# Patient Record
Sex: FEMALE | Race: WHITE | NOT HISPANIC OR LATINO
[De-identification: names, ages, dates, MRNs, and addresses within clinical notes are randomized per-mention and may not be internally consistent; named-entity substitution may affect disease eponyms.]

---

## 2018-04-26 ENCOUNTER — RESULT REVIEW (OUTPATIENT)
Age: 38
End: 2018-04-26

## 2019-06-26 ENCOUNTER — RECORD ABSTRACTING (OUTPATIENT)
Age: 39
End: 2019-06-26

## 2019-06-26 DIAGNOSIS — Z80.49 FAMILY HISTORY OF MALIGNANT NEOPLASM OF OTHER GENITAL ORGANS: ICD-10-CM

## 2019-06-26 DIAGNOSIS — N96 RECURRENT PREGNANCY LOSS: ICD-10-CM

## 2019-06-26 DIAGNOSIS — Z82.49 FAMILY HISTORY OF ISCHEMIC HEART DISEASE AND OTHER DISEASES OF THE CIRCULATORY SYSTEM: ICD-10-CM

## 2019-06-26 DIAGNOSIS — Z86.19 PERSONAL HISTORY OF OTHER INFECTIOUS AND PARASITIC DISEASES: ICD-10-CM

## 2019-06-26 DIAGNOSIS — Z80.1 FAMILY HISTORY OF MALIGNANT NEOPLASM OF TRACHEA, BRONCHUS AND LUNG: ICD-10-CM

## 2019-06-26 DIAGNOSIS — E55.9 VITAMIN D DEFICIENCY, UNSPECIFIED: ICD-10-CM

## 2019-06-26 LAB — CYTOLOGY CVX/VAG DOC THIN PREP: NORMAL

## 2019-06-26 RX ORDER — UBIDECARENONE/VIT E ACET 100MG-5
CAPSULE ORAL
Refills: 0 | Status: ACTIVE | COMMUNITY

## 2019-06-27 ENCOUNTER — APPOINTMENT (OUTPATIENT)
Dept: OBGYN | Facility: CLINIC | Age: 39
End: 2019-06-27
Payer: COMMERCIAL

## 2019-06-27 VITALS
DIASTOLIC BLOOD PRESSURE: 60 MMHG | BODY MASS INDEX: 25.49 KG/M2 | HEIGHT: 61 IN | WEIGHT: 135 LBS | SYSTOLIC BLOOD PRESSURE: 98 MMHG

## 2019-06-27 DIAGNOSIS — Z00.00 ENCOUNTER FOR GENERAL ADULT MEDICAL EXAMINATION W/OUT ABNORMAL FINDINGS: ICD-10-CM

## 2019-06-27 PROCEDURE — 81025 URINE PREGNANCY TEST: CPT

## 2019-06-27 PROCEDURE — 81003 URINALYSIS AUTO W/O SCOPE: CPT | Mod: QW

## 2019-06-27 PROCEDURE — 99395 PREV VISIT EST AGE 18-39: CPT

## 2019-06-29 LAB — HPV HIGH+LOW RISK DNA PNL CVX: NOT DETECTED

## 2019-07-01 ENCOUNTER — RESULT CHARGE (OUTPATIENT)
Age: 39
End: 2019-07-01

## 2019-07-01 NOTE — PHYSICAL EXAM
[Awake] : awake [Alert] : alert [Soft] : soft [Oriented x3] : oriented to person, place, and time [Labia Majora] : labia major [Labia Minora] : labia minora [Normal] : clitoris [No Bleeding] : there was no active vaginal bleeding [Uterine Adnexae] : were not tender and not enlarged [Acute Distress] : no acute distress [Mass] : no breast mass [Nipple Discharge] : no nipple discharge [Axillary LAD] : no axillary lymphadenopathy [Tender] : non tender [FreeTextEntry6] : normal

## 2019-07-03 LAB
BILIRUB UR QL STRIP: NEGATIVE
CLARITY UR: CLEAR
COLLECTION METHOD: NORMAL
CYTOLOGY CVX/VAG DOC THIN PREP: NORMAL
GLUCOSE UR-MCNC: NEGATIVE
HCG UR QL: 0.2 EU/DL
HCG UR QL: NEGATIVE
HGB UR QL STRIP.AUTO: NEGATIVE
KETONES UR-MCNC: NEGATIVE
LEUKOCYTE ESTERASE UR QL STRIP: NEGATIVE
NITRITE UR QL STRIP: NEGATIVE
PH UR STRIP: 7
PROT UR STRIP-MCNC: NEGATIVE
QUALITY CONTROL: YES
SP GR UR STRIP: 1.01

## 2019-07-13 ENCOUNTER — TRANSCRIPTION ENCOUNTER (OUTPATIENT)
Age: 39
End: 2019-07-13

## 2019-07-18 ENCOUNTER — TRANSCRIPTION ENCOUNTER (OUTPATIENT)
Age: 39
End: 2019-07-18

## 2019-07-24 ENCOUNTER — TRANSCRIPTION ENCOUNTER (OUTPATIENT)
Age: 39
End: 2019-07-24

## 2020-01-21 ENCOUNTER — APPOINTMENT (OUTPATIENT)
Dept: OBGYN | Facility: CLINIC | Age: 40
End: 2020-01-21
Payer: COMMERCIAL

## 2020-01-21 VITALS
SYSTOLIC BLOOD PRESSURE: 130 MMHG | HEIGHT: 61 IN | WEIGHT: 142 LBS | BODY MASS INDEX: 26.81 KG/M2 | DIASTOLIC BLOOD PRESSURE: 82 MMHG

## 2020-01-21 DIAGNOSIS — Z78.9 OTHER SPECIFIED HEALTH STATUS: ICD-10-CM

## 2020-01-21 PROCEDURE — 56501 DESTROY VULVA LESIONS SIM: CPT

## 2020-01-21 PROCEDURE — 99213 OFFICE O/P EST LOW 20 MIN: CPT | Mod: 25

## 2020-01-22 NOTE — COUNSELING
[Preconception Care] : preconception care [Menstrual Calendar] : menstrual calendar [Fertility Options] : fertility options

## 2020-01-22 NOTE — PHYSICAL EXAM
[C.Acuminatum] : condyloma acuminatum [Labia Majora] : labia major [Labia Minora] : labia minora [Normal] : clitoris [de-identified] : 3 small condyloma noted one on left inner, one lower left vulva, one on inner right vulva  - tx TCA

## 2020-01-22 NOTE — HISTORY OF PRESENT ILLNESS
[Reproductive Age] : is of reproductive age [Last Pap ___] : Last cervical pap smear was [unfilled] [Menarche Age: ____] : age at menarche was [unfilled] [Pregnancy History] : pregnancy history: [Total Preg ___] : [unfilled] [Living ___] : [unfilled] [AB Spont ___] : miscarriages: [unfilled] [Definite:  ___ (Date)] : the last menstrual period was [unfilled] [Sexually Active] : is sexually active [Monogamous] : is monogamous [Male ___] : [unfilled] male [No] : no [Menstrual Problems] : reports normal menses [Contraception] : does not use contraception

## 2020-01-22 NOTE — DISCUSSION/SUMMARY
[FreeTextEntry1] : Pt aware condyloma should resolve in 2 weeks.  Ovulation discussed at length.  Pt will initiate coitus, every other day, starting day 10 of her cycle.  Pt considering having her  see urologist for  evaluation.  All the pt's questions and concerns were addressed.

## 2020-02-25 ENCOUNTER — APPOINTMENT (OUTPATIENT)
Dept: OBGYN | Facility: CLINIC | Age: 40
End: 2020-02-25
Payer: COMMERCIAL

## 2020-02-25 VITALS
BODY MASS INDEX: 27.38 KG/M2 | DIASTOLIC BLOOD PRESSURE: 64 MMHG | SYSTOLIC BLOOD PRESSURE: 112 MMHG | HEIGHT: 61 IN | WEIGHT: 145 LBS

## 2020-02-25 LAB
HCG UR QL: POSITIVE
QUALITY CONTROL: YES

## 2020-02-25 PROCEDURE — 99213 OFFICE O/P EST LOW 20 MIN: CPT

## 2020-02-25 PROCEDURE — 81025 URINE PREGNANCY TEST: CPT

## 2020-02-25 NOTE — HISTORY OF PRESENT ILLNESS
[1 Year Ago] : 1 year ago [Irregular Menses] : irregular menses [___ Month(s) Ago] : [unfilled] month(s) ago [Good] : being in good health [Healthy Diet] : a healthy diet [Regular Exercise] : regular exercise [Last Pap ___] : Last cervical pap smear was [unfilled] [Reproductive Age] : is of reproductive age [Pregnancy History] : denies prior pregnancies [Definite:  ___ (Date)] : the last menstrual period was [unfilled] [Menarche Age: ____] : age at menarche was [unfilled] [Sexually Active] : is sexually active [Monogamous] : is monogamous [Contraception] : does not use contraception [Male ___] : [unfilled] male

## 2020-02-25 NOTE — DISCUSSION/SUMMARY
[FreeTextEntry1] : PT to RTO 1stOB/sono.  Call parameters reviewed.  All the pt's and 's questions and concerns were addressed.

## 2020-02-25 NOTE — HISTORY OF PRESENT ILLNESS
[1 Year Ago] : 1 year ago [Irregular Menses] : irregular menses [___ Month(s) Ago] : [unfilled] month(s) ago [Good] : being in good health [Healthy Diet] : a healthy diet [Regular Exercise] : regular exercise [Last Pap ___] : Last cervical pap smear was [unfilled] [Reproductive Age] : is of reproductive age [Pregnancy History] : denies prior pregnancies [Definite:  ___ (Date)] : the last menstrual period was [unfilled] [Menarche Age: ____] : age at menarche was [unfilled] [Sexually Active] : is sexually active [Contraception] : does not use contraception [Monogamous] : is monogamous [Male ___] : [unfilled] male

## 2020-03-16 ENCOUNTER — NON-APPOINTMENT (OUTPATIENT)
Age: 40
End: 2020-03-16

## 2020-03-16 ENCOUNTER — APPOINTMENT (OUTPATIENT)
Dept: OBGYN | Facility: CLINIC | Age: 40
End: 2020-03-16
Payer: COMMERCIAL

## 2020-03-16 ENCOUNTER — ASOB RESULT (OUTPATIENT)
Age: 40
End: 2020-03-16

## 2020-03-16 VITALS
BODY MASS INDEX: 27.68 KG/M2 | DIASTOLIC BLOOD PRESSURE: 76 MMHG | SYSTOLIC BLOOD PRESSURE: 116 MMHG | WEIGHT: 141 LBS | HEIGHT: 60 IN

## 2020-03-16 LAB
BILIRUB UR QL STRIP: NORMAL
COLLECTION METHOD: NORMAL
GLUCOSE UR-MCNC: NORMAL
HCG UR QL: 0.2 EU/DL
HGB UR QL STRIP.AUTO: NORMAL
KETONES UR-MCNC: NORMAL
LEUKOCYTE ESTERASE UR QL STRIP: NORMAL
NITRITE UR QL STRIP: NORMAL
PH UR STRIP: 7
PROT UR STRIP-MCNC: NORMAL
SP GR UR STRIP: 1.01

## 2020-03-16 PROCEDURE — 0500F INITIAL PRENATAL CARE VISIT: CPT

## 2020-03-16 PROCEDURE — 76817 TRANSVAGINAL US OBSTETRIC: CPT

## 2020-03-16 PROCEDURE — 36415 COLL VENOUS BLD VENIPUNCTURE: CPT

## 2020-03-16 PROCEDURE — 99214 OFFICE O/P EST MOD 30 MIN: CPT | Mod: 25

## 2020-03-17 LAB
ABO + RH PNL BLD: NORMAL
BASOPHILS # BLD AUTO: 0.1 K/UL
BASOPHILS NFR BLD AUTO: 0.9 %
BLD GP AB SCN SERPL QL: NORMAL
C TRACH RRNA SPEC QL NAA+PROBE: NOT DETECTED
CMV IGG SERPL QL: <0.2 U/ML
CMV IGG SERPL-IMP: NEGATIVE
CMV IGM SERPL QL: <8 AU/ML
CMV IGM SERPL QL: NEGATIVE
EOSINOPHIL # BLD AUTO: 0.18 K/UL
EOSINOPHIL NFR BLD AUTO: 1.6 %
ESTIMATED AVERAGE GLUCOSE: 94 MG/DL
HBA1C MFR BLD HPLC: 4.9 %
HBV SURFACE AG SER QL: NONREACTIVE
HCT VFR BLD CALC: 36.6 %
HGB BLD-MCNC: 12.2 G/DL
HIV1+2 AB SPEC QL IA.RAPID: NONREACTIVE
IMM GRANULOCYTES NFR BLD AUTO: 0.5 %
LYMPHOCYTES # BLD AUTO: 2.5 K/UL
LYMPHOCYTES NFR BLD AUTO: 22.3 %
MAN DIFF?: NORMAL
MCHC RBC-ENTMCNC: 31.9 PG
MCHC RBC-ENTMCNC: 33.3 GM/DL
MCV RBC AUTO: 95.6 FL
MEV IGG FLD QL IA: 9.6 AU/ML
MEV IGG+IGM SER-IMP: NEGATIVE
MONOCYTES # BLD AUTO: 0.88 K/UL
MONOCYTES NFR BLD AUTO: 7.8 %
N GONORRHOEA RRNA SPEC QL NAA+PROBE: NOT DETECTED
NEUTROPHILS # BLD AUTO: 7.51 K/UL
NEUTROPHILS NFR BLD AUTO: 66.9 %
PLATELET # BLD AUTO: 372 K/UL
RBC # BLD: 3.83 M/UL
RBC # FLD: 12.5 %
RUBV IGG FLD-ACNC: 14.9 INDEX
RUBV IGG SER-IMP: POSITIVE
SOURCE AMPLIFICATION: NORMAL
T GONDII AB SER-IMP: NEGATIVE
T GONDII AB SER-IMP: NEGATIVE
T GONDII IGG SER QL: <3 IU/ML
T GONDII IGM SER QL: <3 AU/ML
T PALLIDUM AB SER QL IA: NEGATIVE
TSH SERPL-ACNC: 3.96 UIU/ML
WBC # FLD AUTO: 11.23 K/UL

## 2020-03-19 LAB
B19V IGG SER QL IA: 6.8 INDEX
B19V IGG+IGM SER-IMP: NORMAL
B19V IGG+IGM SER-IMP: POSITIVE
B19V IGM FLD-ACNC: 0.1
B19V IGM SER-ACNC: NEGATIVE
HGB A MFR BLD: 97.4 %
HGB A2 MFR BLD: 2.6 %
HGB FRACT BLD-IMP: NORMAL

## 2020-03-20 LAB — FMR1 GENE MUT ANL BLD/T: NORMAL

## 2020-03-23 LAB
AR GENE MUT ANL BLD/T: NEGATIVE
CFTR MUT TESTED BLD/T: NEGATIVE

## 2020-04-01 ENCOUNTER — APPOINTMENT (OUTPATIENT)
Dept: ANTEPARTUM | Facility: CLINIC | Age: 40
End: 2020-04-01

## 2020-04-03 ENCOUNTER — APPOINTMENT (OUTPATIENT)
Dept: MATERNAL FETAL MEDICINE | Facility: CLINIC | Age: 40
End: 2020-04-03
Payer: COMMERCIAL

## 2020-04-03 ENCOUNTER — ASOB RESULT (OUTPATIENT)
Age: 40
End: 2020-04-03

## 2020-04-03 PROCEDURE — 99441: CPT

## 2020-04-04 ENCOUNTER — LABORATORY RESULT (OUTPATIENT)
Age: 40
End: 2020-04-04

## 2020-04-09 ENCOUNTER — APPOINTMENT (OUTPATIENT)
Dept: OBGYN | Facility: CLINIC | Age: 40
End: 2020-04-09
Payer: COMMERCIAL

## 2020-04-09 ENCOUNTER — NON-APPOINTMENT (OUTPATIENT)
Age: 40
End: 2020-04-09

## 2020-04-09 ENCOUNTER — ASOB RESULT (OUTPATIENT)
Age: 40
End: 2020-04-09

## 2020-04-09 VITALS — DIASTOLIC BLOOD PRESSURE: 65 MMHG | WEIGHT: 139 LBS | BODY MASS INDEX: 27.15 KG/M2 | SYSTOLIC BLOOD PRESSURE: 105 MMHG

## 2020-04-09 LAB
BILIRUB UR QL STRIP: NORMAL
CLARITY UR: CLEAR
COLLECTION METHOD: NORMAL
GLUCOSE UR-MCNC: NORMAL
HCG UR QL: 0.2 EU/DL
HGB UR QL STRIP.AUTO: NORMAL
KETONES UR-MCNC: NORMAL
LEUKOCYTE ESTERASE UR QL STRIP: NORMAL
NITRITE UR QL STRIP: NORMAL
PH UR STRIP: 7
PROT UR STRIP-MCNC: NORMAL
SP GR UR STRIP: 1.01

## 2020-04-09 PROCEDURE — 76813 OB US NUCHAL MEAS 1 GEST: CPT

## 2020-04-09 PROCEDURE — 76801 OB US < 14 WKS SINGLE FETUS: CPT | Mod: 59

## 2020-04-09 PROCEDURE — 0502F SUBSEQUENT PRENATAL CARE: CPT

## 2020-04-09 PROCEDURE — 99213 OFFICE O/P EST LOW 20 MIN: CPT

## 2020-04-09 PROCEDURE — 36415 COLL VENOUS BLD VENIPUNCTURE: CPT

## 2020-04-10 VITALS — BODY MASS INDEX: 27.48 KG/M2 | WEIGHT: 140 LBS | HEIGHT: 60 IN

## 2020-04-10 PROBLEM — Z86.59 HISTORY OF ANXIETY: Status: RESOLVED | Noted: 2019-06-26 | Resolved: 2020-04-10

## 2020-04-10 PROBLEM — Z34.91 ENCOUNTER FOR SUPERVISION OF NORMAL PREGNANCY IN FIRST TRIMESTER, UNSPECIFIED GRAVIDITY: Status: RESOLVED | Noted: 2020-03-16 | Resolved: 2020-04-10

## 2020-04-10 PROBLEM — Z32.01 PREGNANCY CONFIRMED BY POSITIVE URINE TEST: Status: RESOLVED | Noted: 2020-02-25 | Resolved: 2020-04-10

## 2020-04-10 PROBLEM — Z01.419 ENCOUNTER FOR ANNUAL ROUTINE GYNECOLOGICAL EXAMINATION: Status: RESOLVED | Noted: 2019-06-27 | Resolved: 2020-04-10

## 2020-04-10 PROBLEM — Z31.69 ENCOUNTER FOR PRECONCEPTION CONSULTATION: Status: RESOLVED | Noted: 2020-01-21 | Resolved: 2020-04-10

## 2020-04-10 PROBLEM — Z87.09 HISTORY OF ASTHMA: Status: RESOLVED | Noted: 2019-06-26 | Resolved: 2020-04-10

## 2020-04-10 PROBLEM — Z87.42 HISTORY OF MENORRHAGIA: Status: RESOLVED | Noted: 2019-06-26 | Resolved: 2020-04-10

## 2020-04-10 PROBLEM — R87.610 ATYPICAL SQUAMOUS CELLS OF UNDETERMINED SIGNIFICANCE (ASCUS) ON PAPANICOLAOU SMEAR OF CERVIX: Status: RESOLVED | Noted: 2019-06-26 | Resolved: 2020-04-10

## 2020-04-10 PROBLEM — A63.0 CONDYLOMA ACUMINATUM IN FEMALE: Status: RESOLVED | Noted: 2019-06-26 | Resolved: 2020-04-10

## 2020-04-10 RX ORDER — VITAMIN C, CALCIUM, IRON, VITAMIN D3, VITAMIN E, VITAMIN B1, VITAMIN B2, VITAMIN B3, VITAMIN B6, FOLIC ACID, IODINE, ZINC, COPPER, DOCUSATE SODIUM, DOCOSAHEXAENOIC ACID (DHA) 27-1-50 MG
KIT ORAL
Refills: 0 | Status: ACTIVE | COMMUNITY

## 2020-04-14 LAB
1ST TRIMESTER DATA: NORMAL
ADDENDUM DOC: NORMAL
AFP PNL SERPL: NORMAL
AFP SERPL-ACNC: NORMAL
CLINICAL BIOCHEMIST REVIEW: NORMAL
FREE BETA HCG 1ST TRIMESTER: NORMAL
Lab: NORMAL
NASAL BONE: PRESENT
NOTES NTD: NORMAL
NT: NORMAL
PAPP-A SERPL-ACNC: NORMAL
TRISOMY 18/3: NORMAL

## 2020-04-16 ENCOUNTER — APPOINTMENT (OUTPATIENT)
Dept: ANTEPARTUM | Facility: CLINIC | Age: 40
End: 2020-04-16

## 2020-04-16 ENCOUNTER — APPOINTMENT (OUTPATIENT)
Dept: MATERNAL FETAL MEDICINE | Facility: CLINIC | Age: 40
End: 2020-04-16
Payer: SELF-PAY

## 2020-04-16 DIAGNOSIS — R87.610 ATYPICAL SQUAMOUS CELLS OF UNDETERMINED SIGNIFICANCE ON CYTOLOGIC SMEAR OF CERVIX (ASC-US): ICD-10-CM

## 2020-04-16 DIAGNOSIS — Z87.42 PERSONAL HISTORY OF OTHER DISEASES OF THE FEMALE GENITAL TRACT: ICD-10-CM

## 2020-04-16 DIAGNOSIS — Z34.91 ENCOUNTER FOR SUPERVISION OF NORMAL PREGNANCY, UNSPECIFIED, FIRST TRIMESTER: ICD-10-CM

## 2020-04-16 DIAGNOSIS — O09.522 SUPERVISION OF ELDERLY MULTIGRAVIDA, SECOND TRIMESTER: ICD-10-CM

## 2020-04-16 DIAGNOSIS — Z86.59 PERSONAL HISTORY OF OTHER MENTAL AND BEHAVIORAL DISORDERS: ICD-10-CM

## 2020-04-16 DIAGNOSIS — Z3A.13 13 WEEKS GESTATION OF PREGNANCY: ICD-10-CM

## 2020-04-16 DIAGNOSIS — A63.0 ANOGENITAL (VENEREAL) WARTS: ICD-10-CM

## 2020-04-16 DIAGNOSIS — Z87.09 PERSONAL HISTORY OF OTHER DISEASES OF THE RESPIRATORY SYSTEM: ICD-10-CM

## 2020-04-16 DIAGNOSIS — Z32.01 ENCOUNTER FOR PREGNANCY TEST, RESULT POSITIVE: ICD-10-CM

## 2020-04-16 DIAGNOSIS — Z31.69 ENCOUNTER FOR OTHER GENERAL COUNSELING AND ADVICE ON PROCREATION: ICD-10-CM

## 2020-04-16 DIAGNOSIS — Z01.419 ENCOUNTER FOR GYNECOLOGICAL EXAMINATION (GENERAL) (ROUTINE) W/OUT ABNORMAL FINDINGS: ICD-10-CM

## 2020-04-16 PROCEDURE — 99203 OFFICE O/P NEW LOW 30 MIN: CPT | Mod: 95

## 2020-04-16 NOTE — DISCUSSION/SUMMARY
[FreeTextEntry1] : America is a 39-year-old  being seen at 13 1/2 weeks estimated gestational age for advanced maternal age. Her obstetrical history is significant for one first trimester miscarriage. The patient has had genetic counseling, a report was sent under separate cover.\par \par Patient had genetic counseling performed on . First trimester genetic testing was performed and demonstrated low risk for fetal aneuploidy of greater than one in 10,000. In addition non interventional prenatal screening was also performed and showed low risk for fetal aneuploidy with gender determined to be female. Evaluation of all the patient's blood were performed in your office is mostly within normal limits. She has a slight elevation in her white blood cell count that is of questionable clinical significance. In addition she has a positive IgG antibody for parvovirus B19. All other blood tests performed are normal. All of these results were discussed with the patient and the significance of all the above was discussed. The patient has an appointment with you in 3 weeks and sequential blood work should performed. A comprehensive ultrasound at approximately 22 weeks will be scheduled. Clovid-19 was discussed with the patient and proper precautions were also discussed. The patient was advised that should she demonstrate symptoms consistent with this virus that she should stay home and assume she has the virus and should symptoms progress medical evaluation would be recommended. Routine prenatal care done is recommended. All of the above was discussed with the patient and all of her questions were answered.\par \par Her mother has hypertension. She had asthma as a child. She has no previous surgical history. She is allergic to amoxicillin and denies alcohol, tobacco or drug use.\par \par I spent a total of 30 minutes of which greater than 50% was counseling and coordinating care.\par \par Recommendations;\par \par #1. Sequential blood work between 16 and 18 weeks.\par #2. Comprehensive ultrasound at 22 weeks is recommended.\par #3. Routine prenatal care.\par #4. Followup maternal fetal medicine consultation as clinically indicated.

## 2020-05-07 ENCOUNTER — NON-APPOINTMENT (OUTPATIENT)
Age: 40
End: 2020-05-07

## 2020-05-07 ENCOUNTER — APPOINTMENT (OUTPATIENT)
Dept: OBGYN | Facility: CLINIC | Age: 40
End: 2020-05-07
Payer: COMMERCIAL

## 2020-05-07 VITALS
DIASTOLIC BLOOD PRESSURE: 72 MMHG | WEIGHT: 140 LBS | HEIGHT: 61 IN | BODY MASS INDEX: 26.43 KG/M2 | SYSTOLIC BLOOD PRESSURE: 100 MMHG

## 2020-05-07 PROCEDURE — 0502F SUBSEQUENT PRENATAL CARE: CPT

## 2020-05-07 PROCEDURE — 99213 OFFICE O/P EST LOW 20 MIN: CPT

## 2020-05-09 LAB
BILIRUB UR QL STRIP: NORMAL
CLARITY UR: CLEAR
COLLECTION METHOD: NORMAL
GLUCOSE UR-MCNC: NORMAL
HCG UR QL: 0.2 EU/DL
HGB UR QL STRIP.AUTO: NORMAL
KETONES UR-MCNC: NORMAL
LEUKOCYTE ESTERASE UR QL STRIP: NORMAL
NITRITE UR QL STRIP: NORMAL
PH UR STRIP: 7.5
PROT UR STRIP-MCNC: NORMAL
SP GR UR STRIP: 1.01

## 2020-05-11 LAB
1ST TRIMESTER DATA: NORMAL
2ND TRIMESTER DATA: NORMAL
AFP PNL SERPL: NORMAL
AFP SERPL-ACNC: NORMAL
AFP SERPL-ACNC: NORMAL
B-HCG FREE SERPL-MCNC: NORMAL
CLINICAL BIOCHEMIST REVIEW: NORMAL
FREE BETA HCG 1ST TRIMESTER: NORMAL
INHIBIN A SERPL-MCNC: NORMAL
NASAL BONE: PRESENT
NOTES NTD: NORMAL
NT: NORMAL
PAPP-A SERPL-ACNC: NORMAL
U ESTRIOL SERPL-SCNC: NORMAL

## 2020-05-18 ENCOUNTER — TRANSCRIPTION ENCOUNTER (OUTPATIENT)
Age: 40
End: 2020-05-18

## 2020-06-02 ENCOUNTER — TRANSCRIPTION ENCOUNTER (OUTPATIENT)
Age: 40
End: 2020-06-02

## 2020-06-09 ENCOUNTER — APPOINTMENT (OUTPATIENT)
Dept: OBGYN | Facility: CLINIC | Age: 40
End: 2020-06-09
Payer: COMMERCIAL

## 2020-06-09 PROCEDURE — ZZZZZ: CPT

## 2020-06-12 ENCOUNTER — ASOB RESULT (OUTPATIENT)
Age: 40
End: 2020-06-12

## 2020-06-12 ENCOUNTER — APPOINTMENT (OUTPATIENT)
Dept: ANTEPARTUM | Facility: CLINIC | Age: 40
End: 2020-06-12
Payer: COMMERCIAL

## 2020-06-12 PROCEDURE — 76811 OB US DETAILED SNGL FETUS: CPT | Mod: 59

## 2020-06-15 ENCOUNTER — APPOINTMENT (OUTPATIENT)
Dept: ANTEPARTUM | Facility: CLINIC | Age: 40
End: 2020-06-15

## 2020-06-16 ENCOUNTER — APPOINTMENT (OUTPATIENT)
Dept: OBGYN | Facility: CLINIC | Age: 40
End: 2020-06-16

## 2020-08-24 ENCOUNTER — APPOINTMENT (OUTPATIENT)
Dept: ANTEPARTUM | Facility: CLINIC | Age: 40
End: 2020-08-24
Payer: COMMERCIAL

## 2020-08-24 ENCOUNTER — ASOB RESULT (OUTPATIENT)
Age: 40
End: 2020-08-24

## 2020-08-24 PROCEDURE — 76816 OB US FOLLOW-UP PER FETUS: CPT

## 2020-09-21 ENCOUNTER — ASOB RESULT (OUTPATIENT)
Age: 40
End: 2020-09-21

## 2020-09-21 ENCOUNTER — APPOINTMENT (OUTPATIENT)
Dept: ANTEPARTUM | Facility: CLINIC | Age: 40
End: 2020-09-21
Payer: COMMERCIAL

## 2020-09-21 PROCEDURE — 76816 OB US FOLLOW-UP PER FETUS: CPT

## 2020-10-14 ENCOUNTER — INPATIENT (INPATIENT)
Facility: HOSPITAL | Age: 40
LOS: 2 days | Discharge: HOME | End: 2020-10-17
Attending: OBSTETRICS & GYNECOLOGY | Admitting: OBSTETRICS & GYNECOLOGY
Payer: COMMERCIAL

## 2020-10-14 ENCOUNTER — TRANSCRIPTION ENCOUNTER (OUTPATIENT)
Age: 40
End: 2020-10-14

## 2020-10-14 VITALS — HEIGHT: 61 IN | WEIGHT: 160.94 LBS

## 2020-10-14 DIAGNOSIS — Z34.00 ENCOUNTER FOR SUPERVISION OF NORMAL FIRST PREGNANCY, UNSPECIFIED TRIMESTER: ICD-10-CM

## 2020-10-14 LAB
BASOPHILS # BLD AUTO: 0.05 K/UL — SIGNIFICANT CHANGE UP (ref 0–0.2)
BASOPHILS NFR BLD AUTO: 0.2 % — SIGNIFICANT CHANGE UP (ref 0–2)
EOSINOPHIL # BLD AUTO: 0 K/UL — SIGNIFICANT CHANGE UP (ref 0–0.5)
EOSINOPHIL NFR BLD AUTO: 0 % — SIGNIFICANT CHANGE UP (ref 0–6)
HCT VFR BLD CALC: 29.7 % — LOW (ref 34.5–45)
HGB BLD-MCNC: 9.6 G/DL — LOW (ref 11.5–15.5)
IMM GRANULOCYTES NFR BLD AUTO: 1.1 % — SIGNIFICANT CHANGE UP (ref 0–1.5)
LYMPHOCYTES # BLD AUTO: 1.14 K/UL — SIGNIFICANT CHANGE UP (ref 1–3.3)
LYMPHOCYTES # BLD AUTO: 5.3 % — LOW (ref 13–44)
MCHC RBC-ENTMCNC: 27.7 PG — SIGNIFICANT CHANGE UP (ref 27–34)
MCHC RBC-ENTMCNC: 32.3 GM/DL — SIGNIFICANT CHANGE UP (ref 32–36)
MCV RBC AUTO: 85.8 FL — SIGNIFICANT CHANGE UP (ref 80–100)
MONOCYTES # BLD AUTO: 0.56 K/UL — SIGNIFICANT CHANGE UP (ref 0–0.9)
MONOCYTES NFR BLD AUTO: 2.6 % — SIGNIFICANT CHANGE UP (ref 2–14)
NEUTROPHILS # BLD AUTO: 19.57 K/UL — HIGH (ref 1.8–7.4)
NEUTROPHILS NFR BLD AUTO: 90.8 % — HIGH (ref 43–77)
PLATELET # BLD AUTO: 285 K/UL — SIGNIFICANT CHANGE UP (ref 150–400)
RBC # BLD: 3.46 M/UL — LOW (ref 3.8–5.2)
RBC # FLD: 13.2 % — SIGNIFICANT CHANGE UP (ref 10.3–14.5)
SARS-COV-2 RNA SPEC QL NAA+PROBE: SIGNIFICANT CHANGE UP
WBC # BLD: 21.56 K/UL — HIGH (ref 3.8–10.5)
WBC # FLD AUTO: 21.56 K/UL — HIGH (ref 3.8–10.5)

## 2020-10-14 PROCEDURE — G0463: CPT

## 2020-10-14 PROCEDURE — 86769 SARS-COV-2 COVID-19 ANTIBODY: CPT

## 2020-10-14 PROCEDURE — 94760 N-INVAS EAR/PLS OXIMETRY 1: CPT

## 2020-10-14 PROCEDURE — U0003: CPT

## 2020-10-14 PROCEDURE — 86780 TREPONEMA PALLIDUM: CPT

## 2020-10-14 PROCEDURE — 86850 RBC ANTIBODY SCREEN: CPT

## 2020-10-14 PROCEDURE — 86901 BLOOD TYPING SEROLOGIC RH(D): CPT

## 2020-10-14 PROCEDURE — 36415 COLL VENOUS BLD VENIPUNCTURE: CPT

## 2020-10-14 PROCEDURE — 86900 BLOOD TYPING SEROLOGIC ABO: CPT

## 2020-10-14 PROCEDURE — 59050 FETAL MONITOR W/REPORT: CPT

## 2020-10-14 PROCEDURE — 85025 COMPLETE CBC W/AUTO DIFF WBC: CPT

## 2020-10-14 RX ORDER — HYDROMORPHONE HYDROCHLORIDE 2 MG/ML
1 INJECTION INTRAMUSCULAR; INTRAVENOUS; SUBCUTANEOUS
Refills: 0 | Status: DISCONTINUED | OUTPATIENT
Start: 2020-10-15 | End: 2020-10-17

## 2020-10-14 RX ORDER — KETOROLAC TROMETHAMINE 30 MG/ML
30 SYRINGE (ML) INJECTION EVERY 6 HOURS
Refills: 0 | Status: DISCONTINUED | OUTPATIENT
Start: 2020-10-14 | End: 2020-10-15

## 2020-10-14 RX ORDER — LANOLIN
1 OINTMENT (GRAM) TOPICAL EVERY 6 HOURS
Refills: 0 | Status: DISCONTINUED | OUTPATIENT
Start: 2020-10-15 | End: 2020-10-17

## 2020-10-14 RX ORDER — NALOXONE HYDROCHLORIDE 4 MG/.1ML
0.1 SPRAY NASAL
Refills: 0 | Status: DISCONTINUED | OUTPATIENT
Start: 2020-10-15 | End: 2020-10-17

## 2020-10-14 RX ORDER — SIMETHICONE 80 MG/1
80 TABLET, CHEWABLE ORAL EVERY 4 HOURS
Refills: 0 | Status: DISCONTINUED | OUTPATIENT
Start: 2020-10-15 | End: 2020-10-17

## 2020-10-14 RX ORDER — OXYTOCIN 10 UNIT/ML
333.33 VIAL (ML) INJECTION
Qty: 20 | Refills: 0 | Status: DISCONTINUED | OUTPATIENT
Start: 2020-10-15 | End: 2020-10-17

## 2020-10-14 RX ORDER — OXYTOCIN 10 UNIT/ML
333.33 VIAL (ML) INJECTION
Qty: 20 | Refills: 0 | Status: DISCONTINUED | OUTPATIENT
Start: 2020-10-14 | End: 2020-10-15

## 2020-10-14 RX ORDER — OXYCODONE HYDROCHLORIDE 5 MG/1
5 TABLET ORAL
Refills: 0 | Status: DISCONTINUED | OUTPATIENT
Start: 2020-10-15 | End: 2020-10-17

## 2020-10-14 RX ORDER — SODIUM CHLORIDE 9 MG/ML
1000 INJECTION, SOLUTION INTRAVENOUS
Refills: 0 | Status: DISCONTINUED | OUTPATIENT
Start: 2020-10-15 | End: 2020-10-17

## 2020-10-14 RX ORDER — IBUPROFEN 200 MG
600 TABLET ORAL EVERY 6 HOURS
Refills: 0 | Status: COMPLETED | OUTPATIENT
Start: 2020-10-15 | End: 2021-09-13

## 2020-10-14 RX ORDER — CITRIC ACID/SODIUM CITRATE 300-500 MG
30 SOLUTION, ORAL ORAL ONCE
Refills: 0 | Status: DISCONTINUED | OUTPATIENT
Start: 2020-10-14 | End: 2020-10-15

## 2020-10-14 RX ORDER — OXYCODONE HYDROCHLORIDE 5 MG/1
10 TABLET ORAL
Refills: 0 | Status: DISCONTINUED | OUTPATIENT
Start: 2020-10-15 | End: 2020-10-17

## 2020-10-14 RX ORDER — FAMOTIDINE 10 MG/ML
20 INJECTION INTRAVENOUS ONCE
Refills: 0 | Status: COMPLETED | OUTPATIENT
Start: 2020-10-14 | End: 2020-10-14

## 2020-10-14 RX ORDER — DIPHENHYDRAMINE HCL 50 MG
25 CAPSULE ORAL EVERY 6 HOURS
Refills: 0 | Status: DISCONTINUED | OUTPATIENT
Start: 2020-10-15 | End: 2020-10-17

## 2020-10-14 RX ORDER — OXYCODONE HYDROCHLORIDE 5 MG/1
5 TABLET ORAL ONCE
Refills: 0 | Status: DISCONTINUED | OUTPATIENT
Start: 2020-10-15 | End: 2020-10-17

## 2020-10-14 RX ORDER — AZITHROMYCIN 500 MG/1
500 TABLET, FILM COATED ORAL ONCE
Refills: 0 | Status: COMPLETED | OUTPATIENT
Start: 2020-10-14 | End: 2020-10-14

## 2020-10-14 RX ORDER — METOCLOPRAMIDE HCL 10 MG
10 TABLET ORAL ONCE
Refills: 0 | Status: COMPLETED | OUTPATIENT
Start: 2020-10-14 | End: 2020-10-14

## 2020-10-14 RX ORDER — SODIUM CHLORIDE 9 MG/ML
1000 INJECTION, SOLUTION INTRAVENOUS ONCE
Refills: 0 | Status: DISCONTINUED | OUTPATIENT
Start: 2020-10-14 | End: 2020-10-15

## 2020-10-14 RX ORDER — FAMOTIDINE 10 MG/ML
20 INJECTION INTRAVENOUS ONCE
Refills: 0 | Status: DISCONTINUED | OUTPATIENT
Start: 2020-10-14 | End: 2020-10-14

## 2020-10-14 RX ORDER — VANCOMYCIN HCL 1 G
1000 VIAL (EA) INTRAVENOUS ONCE
Refills: 0 | Status: COMPLETED | OUTPATIENT
Start: 2020-10-14 | End: 2020-10-14

## 2020-10-14 RX ORDER — VANCOMYCIN HCL 1 G
1000 VIAL (EA) INTRAVENOUS EVERY 12 HOURS
Refills: 0 | Status: DISCONTINUED | OUTPATIENT
Start: 2020-10-15 | End: 2020-10-15

## 2020-10-14 RX ORDER — ACETAMINOPHEN 500 MG
975 TABLET ORAL
Refills: 0 | Status: DISCONTINUED | OUTPATIENT
Start: 2020-10-15 | End: 2020-10-17

## 2020-10-14 RX ORDER — MAGNESIUM HYDROXIDE 400 MG/1
30 TABLET, CHEWABLE ORAL
Refills: 0 | Status: DISCONTINUED | OUTPATIENT
Start: 2020-10-15 | End: 2020-10-17

## 2020-10-14 RX ORDER — CEFAZOLIN SODIUM 1 G
2000 VIAL (EA) INJECTION ONCE
Refills: 0 | Status: COMPLETED | OUTPATIENT
Start: 2020-10-14 | End: 2020-10-14

## 2020-10-14 RX ORDER — VANCOMYCIN HCL 1 G
VIAL (EA) INTRAVENOUS
Refills: 0 | Status: DISCONTINUED | OUTPATIENT
Start: 2020-10-14 | End: 2020-10-15

## 2020-10-14 RX ORDER — SODIUM CHLORIDE 9 MG/ML
1000 INJECTION, SOLUTION INTRAVENOUS
Refills: 0 | Status: DISCONTINUED | OUTPATIENT
Start: 2020-10-14 | End: 2020-10-15

## 2020-10-14 RX ORDER — ONDANSETRON 8 MG/1
4 TABLET, FILM COATED ORAL EVERY 6 HOURS
Refills: 0 | Status: DISCONTINUED | OUTPATIENT
Start: 2020-10-15 | End: 2020-10-17

## 2020-10-14 RX ORDER — OXYTOCIN 10 UNIT/ML
333.33 VIAL (ML) INJECTION
Qty: 20 | Refills: 0 | Status: DISCONTINUED | OUTPATIENT
Start: 2020-10-14 | End: 2020-10-17

## 2020-10-14 RX ORDER — CITRIC ACID/SODIUM CITRATE 300-500 MG
15 SOLUTION, ORAL ORAL EVERY 6 HOURS
Refills: 0 | Status: DISCONTINUED | OUTPATIENT
Start: 2020-10-14 | End: 2020-10-15

## 2020-10-14 RX ADMIN — Medication 30 MILLIGRAM(S): at 23:12

## 2020-10-14 RX ADMIN — Medication 10 MILLIGRAM(S): at 21:29

## 2020-10-14 RX ADMIN — SODIUM CHLORIDE 125 MILLILITER(S): 9 INJECTION, SOLUTION INTRAVENOUS at 13:21

## 2020-10-14 RX ADMIN — AZITHROMYCIN 255 MILLIGRAM(S): 500 TABLET, FILM COATED ORAL at 21:29

## 2020-10-14 RX ADMIN — Medication 15 MILLILITER(S): at 21:30

## 2020-10-14 RX ADMIN — Medication 30 MILLIGRAM(S): at 23:27

## 2020-10-14 RX ADMIN — Medication 250 MILLIGRAM(S): at 13:20

## 2020-10-14 RX ADMIN — Medication 1000 MILLIUNIT(S)/MIN: at 23:07

## 2020-10-14 RX ADMIN — FAMOTIDINE 20 MILLIGRAM(S): 10 INJECTION INTRAVENOUS at 21:29

## 2020-10-14 RX ADMIN — Medication 100 MILLIGRAM(S): at 22:00

## 2020-10-14 NOTE — DISCHARGE NOTE OB - HOSPITAL COURSE
41 yo  at 39 2/7 weeks gestation, GBS positive, AMA presented to L+D c/o painful contractions and ruptured membranes since 06:30AM.                        9.6    21.56 )-----------( 285      ( 14 Oct 2020 13:02 )             29.7   SHe progressed to full dilatation but fetal distress was noted and a  section was then performed. 39 yo  at 39 2/7 weeks gestation, GBS positive, AMA presented to L+D c/o painful contractions and ruptured membranes since 06:30AM.                        9.6    21.56 )-----------( 285      ( 14 Oct 2020 13:02 )             29.7   SHe progressed to full dilatation but fetal distress was noted and a  section was then performed.   A viable female infant delivered at:  22:05, from OT position, deep in pelvis, with no nuchal cord, weighing 3420g, or 7lb 9oz, length:  20inches, Apgars 9/9.

## 2020-10-14 NOTE — DISCHARGE NOTE OB - MEDICATION SUMMARY - MEDICATIONS TO TAKE
I will START or STAY ON the medications listed below when I get home from the hospital:    acetaminophen 325 mg oral tablet  -- 2 tab(s) by mouth every 8 hours   -- Indication: For pain    ibuprofen 600 mg oral tablet  -- 1 tab(s) by mouth every 6 hours, As needed, Temp greater or equal to 38C (100.4F), Mild Pain (1 - 3), Moderate Pain (4 - 6), Severe Pain (7 - 10)  -- Indication: For pain    ferrous sulfate 325 mg (65 mg elemental iron) oral tablet  -- 1 tab(s) by mouth once a day (at bedtime)   -- Indication: For Anemia     senna oral tablet  -- 2 tab(s) by mouth once a day, As Needed -for constipation   -- Indication: For Constipation

## 2020-10-14 NOTE — DISCHARGE NOTE OB - PATIENT PORTAL LINK FT
You can access the FollowMyHealth Patient Portal offered by [facility] by registering at the following website: [email or web address]. By joining [facility] [employer]’s FollowMyHealth portal, you will also be able to view your health information using other applications (apps) compatible with our system.

## 2020-10-14 NOTE — DISCHARGE NOTE OB - CARE PLAN
Principal Discharge DX:	 delivery delivered  Goal:	recovery  Assessment and plan of treatment:	No heavy lifting for six weeks.  Nothing per vagina for six weeks.

## 2020-10-14 NOTE — DISCHARGE NOTE OB - CARE PROVIDER_API CALL
Leon Black  OBSTETRICS AND GYNECOLOGY  [address], [address] [place] [address]  Phone: [phone]  Fax: [phone]  Established Patient  Scheduled Appointment: 10/22/2020

## 2020-10-14 NOTE — PATIENT PROFILE OB - ALERT: PERTINENT HISTORY
Ultra Screen at 12 Weeks/20 Week Level II Sonogram/Non Invasive Prenatal Screen (NIPS)/1st Trimester Sonogram

## 2020-10-14 NOTE — PATIENT PROFILE OB - BABY A: ELECT TRANSPOND NUMBER, DELIVERY
Today's exam, diagnostic studies, and/or review of records show NO SIGNIFICANT CHANGE from previous exams. [id number]

## 2020-10-15 LAB
SARS-COV-2 IGG SERPL QL IA: POSITIVE
SARS-COV-2 IGM SERPL IA-ACNC: 3.49 INDEX — HIGH
T PALLIDUM AB TITR SER: NEGATIVE — SIGNIFICANT CHANGE UP

## 2020-10-15 RX ORDER — IBUPROFEN 200 MG
600 TABLET ORAL EVERY 6 HOURS
Refills: 0 | Status: DISCONTINUED | OUTPATIENT
Start: 2020-10-15 | End: 2020-10-17

## 2020-10-15 RX ORDER — ENOXAPARIN SODIUM 100 MG/ML
40 INJECTION SUBCUTANEOUS DAILY
Refills: 0 | Status: DISCONTINUED | OUTPATIENT
Start: 2020-10-15 | End: 2020-10-17

## 2020-10-15 RX ADMIN — ONDANSETRON 4 MILLIGRAM(S): 8 TABLET, FILM COATED ORAL at 01:43

## 2020-10-15 RX ADMIN — ENOXAPARIN SODIUM 40 MILLIGRAM(S): 100 INJECTION SUBCUTANEOUS at 21:40

## 2020-10-15 RX ADMIN — Medication 975 MILLIGRAM(S): at 14:42

## 2020-10-15 RX ADMIN — Medication 30 MILLIGRAM(S): at 07:00

## 2020-10-15 RX ADMIN — Medication 30 MILLIGRAM(S): at 05:58

## 2020-10-15 RX ADMIN — Medication 975 MILLIGRAM(S): at 08:33

## 2020-10-15 RX ADMIN — Medication 600 MILLIGRAM(S): at 18:09

## 2020-10-15 RX ADMIN — Medication 600 MILLIGRAM(S): at 13:00

## 2020-10-15 RX ADMIN — Medication 600 MILLIGRAM(S): at 12:04

## 2020-10-15 RX ADMIN — Medication 975 MILLIGRAM(S): at 15:50

## 2020-10-15 RX ADMIN — Medication 975 MILLIGRAM(S): at 09:30

## 2020-10-15 RX ADMIN — Medication 975 MILLIGRAM(S): at 21:38

## 2020-10-15 RX ADMIN — Medication 975 MILLIGRAM(S): at 22:35

## 2020-10-15 RX ADMIN — SODIUM CHLORIDE 125 MILLILITER(S): 9 INJECTION, SOLUTION INTRAVENOUS at 06:47

## 2020-10-15 NOTE — PROGRESS NOTE ADULT - SUBJECTIVE AND OBJECTIVE BOX
Postpartum Note,  Section  40y woman post-operative day:  #1    Subjective:  She is ambulating and tolerating regular diet.  Pt denies dizziness  She denies nausea and vomiting and is not yet passing flatus.    Physical exam:    Vital Signs Last 24 Hrs  T(C): 36.6 (15 Oct 2020 12:28), Max: 37.3 (15 Oct 2020 01:05)  T(F): 97.9 (15 Oct 2020 12:28), Max: 99.2 (15 Oct 2020 01:05)  HR: 79 (15 Oct 2020 12:28) (79 - 110)  BP: 110/70 (15 Oct 2020 12:28) (110/70 - 144/88)  BP(mean): --  RR: 18 (15 Oct 2020 12:28) (18 - 27)  SpO2: 98% (15 Oct 2020 12:28) (96% - 99%)  Lungs:  clear to auscultation bilaterally.  Abdomen: Soft, nontender, nondistended, bowel sounds normoactive  Incision: Clean, dry, and intact.  Pelvic: light  lochia noted  Ext: No calf tenderness    LABS:                        9.6    21.56 )-----------( 285      ( 14 Oct 2020 13:02 )             29.7         Assessment and Plan  POD # 1  s/p primary  after attempted vaginal delivery   Doing well.  Encourage ambulation.

## 2020-10-16 LAB
BASOPHILS # BLD AUTO: 0.08 K/UL — SIGNIFICANT CHANGE UP (ref 0–0.2)
BASOPHILS NFR BLD AUTO: 0.4 % — SIGNIFICANT CHANGE UP (ref 0–2)
EOSINOPHIL # BLD AUTO: 0.12 K/UL — SIGNIFICANT CHANGE UP (ref 0–0.5)
EOSINOPHIL NFR BLD AUTO: 0.6 % — SIGNIFICANT CHANGE UP (ref 0–6)
HCT VFR BLD CALC: 21.6 % — LOW (ref 34.5–45)
HGB BLD-MCNC: 7.2 G/DL — LOW (ref 11.5–15.5)
IMM GRANULOCYTES NFR BLD AUTO: 1.2 % — SIGNIFICANT CHANGE UP (ref 0–1.5)
LYMPHOCYTES # BLD AUTO: 1.75 K/UL — SIGNIFICANT CHANGE UP (ref 1–3.3)
LYMPHOCYTES # BLD AUTO: 8.9 % — LOW (ref 13–44)
MCHC RBC-ENTMCNC: 28.2 PG — SIGNIFICANT CHANGE UP (ref 27–34)
MCHC RBC-ENTMCNC: 33.3 GM/DL — SIGNIFICANT CHANGE UP (ref 32–36)
MCV RBC AUTO: 84.7 FL — SIGNIFICANT CHANGE UP (ref 80–100)
MONOCYTES # BLD AUTO: 0.77 K/UL — SIGNIFICANT CHANGE UP (ref 0–0.9)
MONOCYTES NFR BLD AUTO: 3.9 % — SIGNIFICANT CHANGE UP (ref 2–14)
NEUTROPHILS # BLD AUTO: 16.61 K/UL — HIGH (ref 1.8–7.4)
NEUTROPHILS NFR BLD AUTO: 85 % — HIGH (ref 43–77)
PLATELET # BLD AUTO: 265 K/UL — SIGNIFICANT CHANGE UP (ref 150–400)
RBC # BLD: 2.55 M/UL — LOW (ref 3.8–5.2)
RBC # FLD: 13.6 % — SIGNIFICANT CHANGE UP (ref 10.3–14.5)
WBC # BLD: 19.57 K/UL — HIGH (ref 3.8–10.5)
WBC # FLD AUTO: 19.57 K/UL — HIGH (ref 3.8–10.5)

## 2020-10-16 RX ADMIN — Medication 600 MILLIGRAM(S): at 00:30

## 2020-10-16 RX ADMIN — Medication 975 MILLIGRAM(S): at 10:36

## 2020-10-16 RX ADMIN — Medication 600 MILLIGRAM(S): at 06:22

## 2020-10-16 RX ADMIN — Medication 600 MILLIGRAM(S): at 07:20

## 2020-10-16 RX ADMIN — Medication 600 MILLIGRAM(S): at 18:04

## 2020-10-16 RX ADMIN — Medication 975 MILLIGRAM(S): at 03:55

## 2020-10-16 RX ADMIN — Medication 975 MILLIGRAM(S): at 03:00

## 2020-10-16 RX ADMIN — Medication 975 MILLIGRAM(S): at 22:25

## 2020-10-16 RX ADMIN — Medication 600 MILLIGRAM(S): at 12:07

## 2020-10-16 RX ADMIN — Medication 975 MILLIGRAM(S): at 09:16

## 2020-10-16 RX ADMIN — Medication 600 MILLIGRAM(S): at 01:25

## 2020-10-16 RX ADMIN — Medication 975 MILLIGRAM(S): at 15:20

## 2020-10-16 RX ADMIN — Medication 975 MILLIGRAM(S): at 21:32

## 2020-10-16 RX ADMIN — ENOXAPARIN SODIUM 40 MILLIGRAM(S): 100 INJECTION SUBCUTANEOUS at 21:31

## 2020-10-16 NOTE — PROGRESS NOTE ADULT - SUBJECTIVE AND OBJECTIVE BOX
Postpartum Note,  Section  40y woman post-operative day:  #2    Subjective:  Pt is feeling better and breast feeding well, pt denies dizziness.  She is ambulating frequently.      Physical exam:    Vital Signs Last 24 Hrs  T(C): 37 (16 Oct 2020 08:57), Max: 37.1 (15 Oct 2020 16:30)  T(F): 98.6 (16 Oct 2020 08:57), Max: 98.8 (15 Oct 2020 20:11)  HR: 77 (16 Oct 2020 08:57) (76 - 87)  BP: 118/65 (16 Oct 2020 08:57) (110/69 - 131/73)  BP(mean): --  RR: 18 (16 Oct 2020 08:57) (18 - 18)  SpO2: 99% (16 Oct 2020 08:57) (96% - 99%)  Lungs:  clear to auscultation bilaterally.  Abdomen: Soft, nontender, nondistended, bowel sounds normoactive  Incision: Clean, dry, and intact.  Pelvic: light  lochia noted  Ext: No calf tenderness    LABS:                        7.2    19.57 )-----------( 265      ( 16 Oct 2020 08:00 )             21.6         Assessment and Plan  POD # 2  s/p primary  after attempted vaginal delivery- vitals stable despite Hgb 7.2.  leukocytosis decreasing.  No focal signs or symptoms of infection noted clinically.  Doing well.  Encourage ambulation.

## 2020-10-17 VITALS
HEART RATE: 76 BPM | TEMPERATURE: 98 F | SYSTOLIC BLOOD PRESSURE: 129 MMHG | DIASTOLIC BLOOD PRESSURE: 76 MMHG | RESPIRATION RATE: 17 BRPM | OXYGEN SATURATION: 99 %

## 2020-10-17 DIAGNOSIS — D62 ACUTE POSTHEMORRHAGIC ANEMIA: ICD-10-CM

## 2020-10-17 LAB
BASOPHILS # BLD AUTO: 0 K/UL — SIGNIFICANT CHANGE UP (ref 0–0.2)
BASOPHILS NFR BLD AUTO: 0 % — SIGNIFICANT CHANGE UP (ref 0–2)
EOSINOPHIL # BLD AUTO: 0 K/UL — SIGNIFICANT CHANGE UP (ref 0–0.5)
EOSINOPHIL NFR BLD AUTO: 0 % — SIGNIFICANT CHANGE UP (ref 0–6)
HCT VFR BLD CALC: 22.2 % — LOW (ref 34.5–45)
HGB BLD-MCNC: 7.4 G/DL — LOW (ref 11.5–15.5)
LYMPHOCYTES # BLD AUTO: 18 % — SIGNIFICANT CHANGE UP (ref 13–44)
LYMPHOCYTES # BLD AUTO: 3.72 K/UL — HIGH (ref 1–3.3)
MCHC RBC-ENTMCNC: 27.9 PG — SIGNIFICANT CHANGE UP (ref 27–34)
MCHC RBC-ENTMCNC: 33.3 GM/DL — SIGNIFICANT CHANGE UP (ref 32–36)
MCV RBC AUTO: 83.8 FL — SIGNIFICANT CHANGE UP (ref 80–100)
MONOCYTES # BLD AUTO: 0.62 K/UL — SIGNIFICANT CHANGE UP (ref 0–0.9)
MONOCYTES NFR BLD AUTO: 3 % — SIGNIFICANT CHANGE UP (ref 2–14)
NEUTROPHILS # BLD AUTO: 16.32 K/UL — HIGH (ref 1.8–7.4)
NEUTROPHILS NFR BLD AUTO: 76 % — SIGNIFICANT CHANGE UP (ref 43–77)
NRBC # BLD: SIGNIFICANT CHANGE UP /100 WBCS (ref 0–0)
PLATELET # BLD AUTO: 347 K/UL — SIGNIFICANT CHANGE UP (ref 150–400)
RBC # BLD: 2.65 M/UL — LOW (ref 3.8–5.2)
RBC # FLD: 13.6 % — SIGNIFICANT CHANGE UP (ref 10.3–14.5)
WBC # BLD: 20.66 K/UL — HIGH (ref 3.8–10.5)
WBC # FLD AUTO: 20.66 K/UL — HIGH (ref 3.8–10.5)

## 2020-10-17 RX ORDER — ASCORBIC ACID 60 MG
500 TABLET,CHEWABLE ORAL
Refills: 0 | Status: DISCONTINUED | OUTPATIENT
Start: 2020-10-17 | End: 2020-10-17

## 2020-10-17 RX ORDER — FERROUS SULFATE 325(65) MG
1 TABLET ORAL
Qty: 30 | Refills: 1
Start: 2020-10-17 | End: 2020-12-15

## 2020-10-17 RX ORDER — FAMOTIDINE 10 MG/ML
1 INJECTION INTRAVENOUS
Qty: 0 | Refills: 0 | DISCHARGE

## 2020-10-17 RX ORDER — SENNA PLUS 8.6 MG/1
2 TABLET ORAL
Qty: 28 | Refills: 0
Start: 2020-10-17 | End: 2020-10-30

## 2020-10-17 RX ORDER — IBUPROFEN 200 MG
1 TABLET ORAL
Qty: 40 | Refills: 0
Start: 2020-10-17 | End: 2020-10-26

## 2020-10-17 RX ORDER — ACETAMINOPHEN 500 MG
2 TABLET ORAL
Qty: 84 | Refills: 0
Start: 2020-10-17 | End: 2020-10-30

## 2020-10-17 RX ORDER — FERROUS SULFATE 325(65) MG
325 TABLET ORAL
Refills: 0 | Status: DISCONTINUED | OUTPATIENT
Start: 2020-10-17 | End: 2020-10-17

## 2020-10-17 RX ADMIN — Medication 500 MILLIGRAM(S): at 10:05

## 2020-10-17 RX ADMIN — Medication 975 MILLIGRAM(S): at 03:31

## 2020-10-17 RX ADMIN — Medication 325 MILLIGRAM(S): at 10:05

## 2020-10-17 RX ADMIN — Medication 975 MILLIGRAM(S): at 04:25

## 2020-10-17 RX ADMIN — Medication 600 MILLIGRAM(S): at 06:28

## 2020-10-17 RX ADMIN — Medication 600 MILLIGRAM(S): at 07:25

## 2020-10-17 RX ADMIN — Medication 600 MILLIGRAM(S): at 01:10

## 2020-10-17 RX ADMIN — Medication 975 MILLIGRAM(S): at 10:06

## 2020-10-17 RX ADMIN — Medication 600 MILLIGRAM(S): at 00:16

## 2020-10-17 NOTE — PROGRESS NOTE ADULT - SUBJECTIVE AND OBJECTIVE BOX
Section/Postpartum    PETR MCLEOD is a  40y woman   , who is now post-operative day 3.    PAST MEDICAL & SURGICAL HISTORY:      Subjective:  The patient feels well.  She is ambulating without difficulty.  She is tolerating PO.  She is voiding.  She denies nausea and vomiting.  Her pain is controlled.  She reports normal postpartum bleeding  She is breastfeeding.  She is formula feeding.    Physical exam:    Vital Signs Last 24 Hrs  T(C): 36.9 (17 Oct 2020 08:45), Max: 37.1 (16 Oct 2020 23:41)  T(F): 98.5 (17 Oct 2020 08:45), Max: 98.7 (16 Oct 2020 23:41)  HR: 76 (17 Oct 2020 08:45) (69 - 78)  BP: 129/76 (17 Oct 2020 08:45) (118/65 - 133/80)  BP(mean): --  RR: 17 (17 Oct 2020 08:45) (17 - 18)  SpO2: 99% (17 Oct 2020 08:45) (98% - 99%)    General Apperance: alert and oriented in no acute distress  Breast: Soft, nontender, non engorged  Abdomen: Soft, nontender, not distended,  Uterine fundus is firm and below the umbilicus, BS(+), Flatus(+), Bowel Movement (+)  Incision: Clean, dry, and intact   Pelvic: Normal lochia noted  Ext: No calf tenderness, No edema or cyanosis  Lochia: not excessive    LABS:                        7.4    20.66 )-----------( 347      ( 17 Oct 2020 07:54 )             22.2     Blood Type: ABO RH Interpretation: A POS   Rubella status:  Immune    Allergies    amoxicillin (Rash)  penicillin (Rash)    Intolerances        MEDICATIONS  (STANDING):  acetaminophen   Tablet .. 975 milliGRAM(s) Oral <User Schedule>  ascorbic acid 500 milliGRAM(s) Oral two times a day  enoxaparin Injectable 40 milliGRAM(s) SubCutaneous daily  ferrous    sulfate 325 milliGRAM(s) Oral two times a day  lactated ringers. 1000 milliLiter(s) (125 mL/Hr) IV Continuous <Continuous>  oxytocin Infusion 333.333 milliUNIT(s)/Min (1000 mL/Hr) IV Continuous <Continuous>  oxytocin Infusion 333.333 milliUNIT(s)/Min (1000 mL/Hr) IV Continuous <Continuous>    MEDICATIONS  (PRN):  diphenhydrAMINE 25 milliGRAM(s) Oral every 6 hours PRN Itching  HYDROmorphone  Injectable 1 milliGRAM(s) IV Push every 3 hours PRN Severe Pain (7 - 10)  ibuprofen  Tablet. 600 milliGRAM(s) Oral every 6 hours PRN Temp greater or equal to 38C (100.4F), Mild Pain (1 - 3), Moderate Pain (4 - 6), Severe Pain (7 - 10)  lanolin Ointment 1 Application(s) Topical every 6 hours PRN Sore Nipples  magnesium hydroxide Suspension 30 milliLiter(s) Oral two times a day PRN Constipation  naloxone Injectable 0.1 milliGRAM(s) IV Push every 3 minutes PRN For ANY of the following changes in patient status:  A. RR LESS THAN 10 breaths per minute, B. Oxygen saturation LESS THAN 90%, C. Sedation score of 6  ondansetron Injectable 4 milliGRAM(s) IV Push every 6 hours PRN Nausea  oxyCODONE    IR 5 milliGRAM(s) Oral every 3 hours PRN Mild Pain (1 - 3)  oxyCODONE    IR 10 milliGRAM(s) Oral every 3 hours PRN Moderate Pain (4 - 6)  oxyCODONE    IR 5 milliGRAM(s) Oral every 3 hours PRN Moderate to Severe Pain (4-10)  oxyCODONE    IR 5 milliGRAM(s) Oral once PRN Moderate to Severe Pain (4-10)  simethicone 80 milliGRAM(s) Chew every 4 hours PRN Gas        Assessment and Plan:    POD # 3  S/P PCS         Doing well.  Encouraged to ambulate.  Drink plenty of water and fluids.  Discharge home today.  Paient to take ibuprofen 600mg every 6 hours and/or Tylenol 2 (325mg tablet) tablets every 6 hoursfor pain  Follow up in 1-2 week(s) for incision check, PETR MCLEOD is to call the office for an appointment.  To use abdominal binder while awake as needed  Verbal discharge instructions d/w patient  - discharge summary to be given to her on discharge for the hospital.  Call for fevers, chills, nausea, vomiting, heavy vaginal bleeding, vaginal discharge, severe pain, symptoms of depression, problems with incision or any other concerning symptoms.  Nothing in vagina and no heavy lifting for  6-8 weeks. No sex!  No driving x 2 weeks,   Patient to continue with prenatal vitamins, Iron supplement, Stool softners

## 2020-10-17 NOTE — PROGRESS NOTE ADULT - ASSESSMENT
Section Day POD 3  40y yo    s/p  who is doing well overall. Hg dropped from 9.6-> 7.2.  - start PO iron  - recheck cbc this AM  - Continue the current pain medication.  - Encourage ambulation and regular diet.  - DVT ppx: ambulation + loveno  - She is stable, tolerates a diet  and normal bowel function.  - She will be discharged on today pending Attending approval and am cbc

## 2020-10-17 NOTE — PROGRESS NOTE ADULT - SUBJECTIVE AND OBJECTIVE BOX
She is a  40y   who is now post-operative day 3 from  primary  section for nonreassuring FHT.    Subjective:  The patient feels well.  She is ambulating and tolerating a diet.  She is having +flatus and + BM.  She reports no breathing problems.  She reports no headache or visual changes.  She reports normal postpartum bleeding.  She reports some feeling like she has restless legs. Denies any dizziness.     Vital Signs Last 24 Hrs  T(C): 37.1 (16 Oct 2020 23:41), Max: 37.1 (16 Oct 2020 23:41)  T(F): 98.7 (16 Oct 2020 23:41), Max: 98.7 (16 Oct 2020 23:41)  HR: 69 (16 Oct 2020 23:41) (69 - 78)  BP: 133/80 (16 Oct 2020 23:41) (118/65 - 133/80)  RR: 17 (16 Oct 2020 23:41) (17 - 18)  SpO2: 98% (16 Oct 2020 23:41) (98% - 99%)    Physical Exam:  Gen: well appearing   Cardio: RRR  Pulm: CTABL, no increased work of breathing  Breast: non-engorged, no erythema  Abdomen: soft, non-tender, non-distended, incision c/d/i  Pelvic: Normal vaginal bleeding noted.  Ext: No swelling/erythema, warm extremities    LABS:                        7.2    19.57 )-----------( 265      ( 16 Oct 2020 08:00 )             21.6         Allergies    amoxicillin (Rash)  penicillin (Rash)    Intolerances

## 2020-10-30 DIAGNOSIS — D62 ACUTE POSTHEMORRHAGIC ANEMIA: ICD-10-CM

## 2020-10-30 DIAGNOSIS — Z3A.39 39 WEEKS GESTATION OF PREGNANCY: ICD-10-CM

## 2020-10-30 DIAGNOSIS — Z88.0 ALLERGY STATUS TO PENICILLIN: ICD-10-CM

## 2020-11-10 DIAGNOSIS — Z87.09 PERSONAL HISTORY OF OTHER DISEASES OF THE RESPIRATORY SYSTEM: ICD-10-CM

## 2020-11-10 DIAGNOSIS — F07.81 POSTCONCUSSIONAL SYNDROME: ICD-10-CM

## 2020-11-10 DIAGNOSIS — Z87.440 PERSONAL HISTORY OF URINARY (TRACT) INFECTIONS: ICD-10-CM

## 2020-11-10 DIAGNOSIS — Z86.39 PERSONAL HISTORY OF OTHER ENDOCRINE, NUTRITIONAL AND METABOLIC DISEASE: ICD-10-CM

## 2020-11-16 ENCOUNTER — APPOINTMENT (OUTPATIENT)
Dept: INTERNAL MEDICINE | Facility: CLINIC | Age: 40
End: 2020-11-16
Payer: COMMERCIAL

## 2020-11-16 VITALS
DIASTOLIC BLOOD PRESSURE: 40 MMHG | WEIGHT: 140 LBS | HEIGHT: 61 IN | BODY MASS INDEX: 26.43 KG/M2 | SYSTOLIC BLOOD PRESSURE: 100 MMHG

## 2020-11-16 DIAGNOSIS — R03.0 ELEVATED BLOOD-PRESSURE READING, W/OUT DIAGNOSIS OF HYPERTENSION: ICD-10-CM

## 2020-11-16 PROCEDURE — 99213 OFFICE O/P EST LOW 20 MIN: CPT

## 2020-11-16 NOTE — PHYSICAL EXAM
[No JVD] : no jugular venous distention [No Lymphadenopathy] : no lymphadenopathy [No Respiratory Distress] : no respiratory distress  [Normal] : normal rate, regular rhythm, normal S1 and S2 and no murmur heard [No Carotid Bruits] : no carotid bruits [No Edema] : there was no peripheral edema

## 2020-11-16 NOTE — PLAN
[FreeTextEntry1] : To try reducing Labetolol to 100mg BID and monitor BP\par  Is scheduled to f/u with GYN next week and can have BP checked there as well,

## 2021-03-18 ENCOUNTER — EMERGENCY (EMERGENCY)
Facility: HOSPITAL | Age: 41
LOS: 0 days | Discharge: HOME | End: 2021-03-18
Attending: EMERGENCY MEDICINE
Payer: COMMERCIAL

## 2021-03-18 VITALS
DIASTOLIC BLOOD PRESSURE: 81 MMHG | TEMPERATURE: 98 F | OXYGEN SATURATION: 98 % | SYSTOLIC BLOOD PRESSURE: 122 MMHG | WEIGHT: 139.99 LBS | HEART RATE: 75 BPM | HEIGHT: 61 IN

## 2021-03-18 DIAGNOSIS — M79.672 PAIN IN LEFT FOOT: ICD-10-CM

## 2021-03-18 DIAGNOSIS — Z88.0 ALLERGY STATUS TO PENICILLIN: ICD-10-CM
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
## 2021-03-18 DIAGNOSIS — S89.82XA OTHER SPECIFIED INJURIES OF LEFT LOWER LEG, INITIAL ENCOUNTER: ICD-10-CM

## 2021-03-18 DIAGNOSIS — S93.602A UNSPECIFIED SPRAIN OF LEFT FOOT, INITIAL ENCOUNTER: ICD-10-CM

## 2021-03-18 PROCEDURE — 73630 X-RAY EXAM OF FOOT: CPT | Mod: LT

## 2021-03-18 PROCEDURE — 73630 X-RAY EXAM OF FOOT: CPT | Mod: 26,LT

## 2021-03-18 PROCEDURE — 99283 EMERGENCY DEPT VISIT LOW MDM: CPT

## 2021-03-18 RX ORDER — IBUPROFEN 200 MG
600 TABLET ORAL ONCE
Refills: 0 | Status: COMPLETED | OUTPATIENT
Start: 2021-03-18 | End: 2021-03-18

## 2021-03-18 RX ADMIN — Medication 600 MILLIGRAM(S): at 19:11

## 2021-03-18 NOTE — ED STATDOCS - NS ED ROS FT
Constitutional: No fever or chills  Eyes: No visual changes  HEENT: No throat pain  CV: No chest pain  Resp: No SOB no cough  GI: No abd pain, nausea or vomiting  : No dysuria  MSK: + left foot pain  Skin: No rash  Neuro: No headache

## 2021-03-18 NOTE — ED STATDOCS - CLINICAL SUMMARY MEDICAL DECISION MAKING FREE TEXT BOX
40F presents to the ED with left foot pain. pt states that she slipped on a step outside and twisted foot. immediate pain moderate constant over foot non-radiating. no pain to head neck chest abd or other joints. didn't take anything for symptoms. exam with + ttp anterior aspect of left foot no ttp base of fifth no ttp knee or ankle normal pulses normal rom. will obtain xray r/o fx pain control and reassess. Shawn Barrett M.D., Attending Physician

## 2021-03-18 NOTE — ED STATDOCS - PROGRESS NOTE DETAILS
Patient seen and evaluated, no acute findings on xray, placed in ace wrap, reviewed RICE, ortho follow up if no improvement -Leah Mccullough PA-C

## 2021-03-18 NOTE — ED STATDOCS - OBJECTIVE STATEMENT
40F presents to the ED with left foot pain. pt states that she slipped on a step outside and twisted foot. immediate pain moderate constant over foot non-radiating. no pain to head neck chest abd or other joints. didn't take anything for symptoms.

## 2021-03-18 NOTE — ED STATDOCS - PHYSICAL EXAMINATION
Constitutional: NAD AAOx3  Eyes: PERRLA EOMI  Head: Normocephalic atraumatic  Mouth: MMM  Cardiac: regular rate   Resp: Lungs CTAB  GI: Abd s/nt/nd  Neuro: CN2-12 intact  Skin: No rashes   msk: + ttp anterior aspect of left foot no ttp base of fifth no ttp knee or ankle normal pulses normal rom.

## 2021-03-18 NOTE — ED STATDOCS - CARE PROVIDER_API CALL
Dc Levin (DO)  Orthopaedic Surgery  [address] [place] [address]  Phone: [phone]  Fax: [phone]  Follow Up Time:

## 2021-03-18 NOTE — ED STATDOCS - PATIENT PORTAL LINK FT
You can access the FollowMyHealth Patient Portal offered by [facility] by registering at the following website: [email or web address]. By joining [employer]’s FollowMyHealth portal, you will also be able to view your health information using other applications (apps) compatible with our system.

## 2021-06-11 NOTE — PATIENT PROFILE OB - TEACHING/LEARNING FACTORS INFLUENCE READINESS TO LEARN
On imaging, patient noted to have findings suggestive of bladder outlet obstruction with moderate hydronephrosis bilaterally  Dale catheter placement attempts in the emergency room and by Urology were unsuccessful  Per ER notes, bladder scan showed 200 mL  Patient underwent cystoscopy with DVIU and placement of Afognak catheter  Patient to be discharged with Dale catheter with outpatient follow-up with Urology in 1 week none

## 2021-07-15 ENCOUNTER — APPOINTMENT (OUTPATIENT)
Dept: INTERNAL MEDICINE | Facility: CLINIC | Age: 41
End: 2021-07-15

## 2021-08-24 ENCOUNTER — APPOINTMENT (OUTPATIENT)
Dept: INTERNAL MEDICINE | Facility: CLINIC | Age: 41
End: 2021-08-24
Payer: COMMERCIAL

## 2021-08-24 VITALS
BODY MASS INDEX: 26.62 KG/M2 | DIASTOLIC BLOOD PRESSURE: 70 MMHG | SYSTOLIC BLOOD PRESSURE: 122 MMHG | HEIGHT: 61 IN | WEIGHT: 141 LBS

## 2021-08-24 PROCEDURE — 99396 PREV VISIT EST AGE 40-64: CPT | Mod: 25

## 2021-08-24 PROCEDURE — 36415 COLL VENOUS BLD VENIPUNCTURE: CPT

## 2021-08-24 NOTE — ASSESSMENT
[FreeTextEntry1] : Her exam is unremarkable.\par Fasting labs including TSH were sent.\par Yearly regular follow-up with her GYN.\par Weight loss with exercise and diet.

## 2021-08-24 NOTE — PHYSICAL EXAM
[No Acute Distress] : no acute distress [No JVD] : no jugular venous distention [No Lymphadenopathy] : no lymphadenopathy [Clear to Auscultation] : lungs were clear to auscultation bilaterally [Normal] : normal rate, regular rhythm, normal S1 and S2 and no murmur heard [No Edema] : there was no peripheral edema [No Joint Swelling] : no joint swelling [No Focal Deficits] : no focal deficits [Alert and Oriented x3] : oriented to person, place, and time

## 2021-08-24 NOTE — HEALTH RISK ASSESSMENT
[No] : In the past 12 months have you used drugs other than those required for medical reasons? No [0] : 2) Feeling down, depressed, or hopeless: Not at all (0) [PHQ-2 Negative - No further assessment needed] : PHQ-2 Negative - No further assessment needed [] : No [[id number]] : 0

## 2021-08-24 NOTE — REVIEW OF SYSTEMS
[Fever] : no fever [Chest Pain] : no chest pain [Palpitations] : no palpitations [Shortness Of Breath] : no shortness of breath [Abdominal Pain] : no abdominal pain [Headache] : no headache [Dizziness] : no dizziness [Negative] : Psychiatric

## 2021-08-24 NOTE — HISTORY OF PRESENT ILLNESS
[de-identified] : 40 y/o presents for annual wellness and fasting labs.\par She has no significant past medical history other than preeclampsia during her recent pregnancy with hypertension.  She had been on labetalol for short period of time but she has now been off it for months and her blood pressure has been normal.\par She feels generally well without any recent illness.\par She has received her first dose of Covid vaccine without incident.

## 2021-08-25 LAB
ALBUMIN SERPL ELPH-MCNC: 5 G/DL
ALP BLD-CCNC: 93 U/L
ALT SERPL-CCNC: 18 U/L
ANION GAP SERPL CALC-SCNC: 11 MMOL/L
AST SERPL-CCNC: 23 U/L
BASOPHILS # BLD AUTO: 0.1 K/UL
BASOPHILS NFR BLD AUTO: 0.8 %
BILIRUB SERPL-MCNC: 0.5 MG/DL
BUN SERPL-MCNC: 7 MG/DL
CALCIUM SERPL-MCNC: 9.3 MG/DL
CHLORIDE SERPL-SCNC: 95 MMOL/L
CHOLEST SERPL-MCNC: 223 MG/DL
CO2 SERPL-SCNC: 26 MMOL/L
CREAT SERPL-MCNC: 0.81 MG/DL
EOSINOPHIL # BLD AUTO: 0.17 K/UL
EOSINOPHIL NFR BLD AUTO: 1.3 %
GLUCOSE SERPL-MCNC: 81 MG/DL
HCT VFR BLD CALC: 40.5 %
HDLC SERPL-MCNC: 66 MG/DL
HGB BLD-MCNC: 13.4 G/DL
IMM GRANULOCYTES NFR BLD AUTO: 0.2 %
LDLC SERPL CALC-MCNC: 137 MG/DL
LYMPHOCYTES # BLD AUTO: 2.1 K/UL
LYMPHOCYTES NFR BLD AUTO: 16.6 %
MAN DIFF?: NORMAL
MCHC RBC-ENTMCNC: 31.5 PG
MCHC RBC-ENTMCNC: 33.1 GM/DL
MCV RBC AUTO: 95.3 FL
MONOCYTES # BLD AUTO: 0.66 K/UL
MONOCYTES NFR BLD AUTO: 5.2 %
NEUTROPHILS # BLD AUTO: 9.56 K/UL
NEUTROPHILS NFR BLD AUTO: 75.9 %
NONHDLC SERPL-MCNC: 157 MG/DL
PLATELET # BLD AUTO: 388 K/UL
POTASSIUM SERPL-SCNC: 4.3 MMOL/L
PROT SERPL-MCNC: 7.6 G/DL
RBC # BLD: 4.25 M/UL
RBC # FLD: 13.1 %
SODIUM SERPL-SCNC: 132 MMOL/L
TRIGL SERPL-MCNC: 100 MG/DL
TSH SERPL-ACNC: 4.83 UIU/ML
WBC # FLD AUTO: 12.62 K/UL

## 2021-09-13 ENCOUNTER — NON-APPOINTMENT (OUTPATIENT)
Age: 41
End: 2021-09-13

## 2021-11-19 ENCOUNTER — APPOINTMENT (OUTPATIENT)
Dept: ULTRASOUND IMAGING | Facility: CLINIC | Age: 41
End: 2021-11-19
Payer: COMMERCIAL

## 2021-11-19 ENCOUNTER — APPOINTMENT (OUTPATIENT)
Dept: MAMMOGRAPHY | Facility: CLINIC | Age: 41
End: 2021-11-19
Payer: COMMERCIAL

## 2021-11-19 ENCOUNTER — OUTPATIENT (OUTPATIENT)
Dept: OUTPATIENT SERVICES | Facility: HOSPITAL | Age: 41
LOS: 1 days | End: 2021-11-19
Payer: COMMERCIAL

## 2021-11-19 DIAGNOSIS — Z12.31 ENCOUNTER FOR SCREENING MAMMOGRAM FOR MALIGNANT NEOPLASM OF BREAST: ICD-10-CM

## 2021-11-19 PROCEDURE — 77066 DX MAMMO INCL CAD BI: CPT | Mod: 26

## 2021-11-19 PROCEDURE — 76641 ULTRASOUND BREAST COMPLETE: CPT | Mod: 26,50

## 2021-11-19 PROCEDURE — G0279: CPT

## 2021-11-19 PROCEDURE — G0279: CPT | Mod: 26

## 2021-11-19 PROCEDURE — 77066 DX MAMMO INCL CAD BI: CPT

## 2021-11-19 PROCEDURE — 76641 ULTRASOUND BREAST COMPLETE: CPT

## 2022-06-15 ENCOUNTER — NON-APPOINTMENT (OUTPATIENT)
Age: 42
End: 2022-06-15

## 2022-06-15 ENCOUNTER — APPOINTMENT (OUTPATIENT)
Dept: INTERNAL MEDICINE | Facility: CLINIC | Age: 42
End: 2022-06-15
Payer: COMMERCIAL

## 2022-06-15 VITALS
SYSTOLIC BLOOD PRESSURE: 104 MMHG | DIASTOLIC BLOOD PRESSURE: 70 MMHG | BODY MASS INDEX: 26.06 KG/M2 | HEIGHT: 61 IN | WEIGHT: 138 LBS

## 2022-06-15 DIAGNOSIS — Z00.00 ENCOUNTER FOR GENERAL ADULT MEDICAL EXAMINATION W/OUT ABNORMAL FINDINGS: ICD-10-CM

## 2022-06-15 PROCEDURE — 36415 COLL VENOUS BLD VENIPUNCTURE: CPT

## 2022-06-15 PROCEDURE — 99000 SPECIMEN HANDLING OFFICE-LAB: CPT

## 2022-06-15 PROCEDURE — 99396 PREV VISIT EST AGE 40-64: CPT | Mod: 25

## 2022-06-15 NOTE — PHYSICAL EXAM
[No Acute Distress] : no acute distress [No Lymphadenopathy] : no lymphadenopathy [Clear to Auscultation] : lungs were clear to auscultation bilaterally [Normal] : normal rate, regular rhythm, normal S1 and S2 and no murmur heard [No Edema] : there was no peripheral edema [No Joint Swelling] : no joint swelling [No Focal Deficits] : no focal deficits [Alert and Oriented x3] : oriented to person, place, and time

## 2022-06-15 NOTE — HEALTH RISK ASSESSMENT
[Never] : Never [Yes] : Yes [2 - 4 times a month (2 pts)] : 2-4 times a month (2 points) [1 or 2 (0 pts)] : 1 or 2 (0 points) [Never (0 pts)] : Never (0 points) [No] : In the past 12 months have you used drugs other than those required for medical reasons? No [0] : 2) Feeling down, depressed, or hopeless: Not at all (0) [PHQ-2 Negative - No further assessment needed] : PHQ-2 Negative - No further assessment needed [Audit-CScore] : 2 [[id number]] : 0

## 2022-06-15 NOTE — ASSESSMENT
[FreeTextEntry1] : Her exam is unremarkable and unchanged.\par Follow-up fasting labs including thyroid function were sent.\par \par Previous preeclampsia/hypertension her blood pressure has remained normal and she was encouraged to monitor it periodically at home and follow-up if the numbers rise.\par \par Mild hyperlipidemia–cholesterol was minimally elevated in the past and a follow-up was sent.  She was told at this point she would do nothing more than continue to watch it and to control her weight.

## 2022-06-15 NOTE — HISTORY OF PRESENT ILLNESS
[de-identified] : 42 y/o female presents for annual wellness exam and follow-up fasting labs.\par She had a history of hypertension towards the end of her pregnancy and had been on labetalol for a period of time but has been off it now at a pressure has reportedly remained normal.  Also previously minimally elevated cholesterol.\par She has been generally well without any recent illness.

## 2022-06-16 LAB
ALBUMIN SERPL ELPH-MCNC: 4.8 G/DL
ALP BLD-CCNC: 68 U/L
ALT SERPL-CCNC: 18 U/L
ANION GAP SERPL CALC-SCNC: 12 MMOL/L
AST SERPL-CCNC: 22 U/L
BASOPHILS # BLD AUTO: 0.07 K/UL
BASOPHILS NFR BLD AUTO: 1 %
BILIRUB SERPL-MCNC: 0.4 MG/DL
BUN SERPL-MCNC: 7 MG/DL
CALCIUM SERPL-MCNC: 9.6 MG/DL
CHLORIDE SERPL-SCNC: 96 MMOL/L
CHOLEST SERPL-MCNC: 205 MG/DL
CO2 SERPL-SCNC: 25 MMOL/L
CREAT SERPL-MCNC: 0.74 MG/DL
EGFR: 105 ML/MIN/1.73M2
EOSINOPHIL # BLD AUTO: 0.31 K/UL
EOSINOPHIL NFR BLD AUTO: 4.5 %
GLUCOSE SERPL-MCNC: 86 MG/DL
HCT VFR BLD CALC: 36.8 %
HDLC SERPL-MCNC: 70 MG/DL
HGB BLD-MCNC: 12.2 G/DL
IMM GRANULOCYTES NFR BLD AUTO: 0.1 %
LDLC SERPL CALC-MCNC: 119 MG/DL
LYMPHOCYTES # BLD AUTO: 2.52 K/UL
LYMPHOCYTES NFR BLD AUTO: 36.3 %
MAN DIFF?: NORMAL
MCHC RBC-ENTMCNC: 31.1 PG
MCHC RBC-ENTMCNC: 33.2 GM/DL
MCV RBC AUTO: 93.9 FL
MONOCYTES # BLD AUTO: 0.6 K/UL
MONOCYTES NFR BLD AUTO: 8.6 %
NEUTROPHILS # BLD AUTO: 3.44 K/UL
NEUTROPHILS NFR BLD AUTO: 49.5 %
NONHDLC SERPL-MCNC: 135 MG/DL
PLATELET # BLD AUTO: 337 K/UL
POTASSIUM SERPL-SCNC: 4.2 MMOL/L
PROT SERPL-MCNC: 7.3 G/DL
RBC # BLD: 3.92 M/UL
RBC # FLD: 13.1 %
SODIUM SERPL-SCNC: 133 MMOL/L
T4 SERPL-MCNC: 6 UG/DL
TRIGL SERPL-MCNC: 80 MG/DL
TSH SERPL-ACNC: 4.09 UIU/ML
WBC # FLD AUTO: 6.95 K/UL

## 2024-06-13 NOTE — ED STATDOCS - CARE PLAN
Is This A New Presentation, Or A Follow-Up?: Warts How Severe Are Your Warts?: moderate Additional History: Patient is here today for warts in his right knee. Principal Discharge DX:	Foot sprain, left, initial encounter

## 2024-07-11 ENCOUNTER — EMERGENCY (EMERGENCY)
Facility: HOSPITAL | Age: 44
LOS: 0 days | Discharge: HOME | End: 2024-07-11
Attending: EMERGENCY MEDICINE
Payer: COMMERCIAL

## 2024-07-11 VITALS
DIASTOLIC BLOOD PRESSURE: 83 MMHG | SYSTOLIC BLOOD PRESSURE: 128 MMHG | OXYGEN SATURATION: 100 % | RESPIRATION RATE: 17 BRPM | HEART RATE: 72 BPM

## 2024-07-11 VITALS — WEIGHT: 142.64 LBS

## 2024-07-11 DIAGNOSIS — R07.89 OTHER CHEST PAIN: ICD-10-CM

## 2024-07-11 DIAGNOSIS — R20.2 PARESTHESIA OF SKIN: ICD-10-CM

## 2024-07-11 DIAGNOSIS — Z88.0 ALLERGY STATUS TO PENICILLIN: ICD-10-CM

## 2024-07-11 LAB
ALBUMIN SERPL ELPH-MCNC: 4.3 G/DL — SIGNIFICANT CHANGE UP (ref 3.3–5)
ALP SERPL-CCNC: 56 U/L — SIGNIFICANT CHANGE UP (ref 40–120)
ALT FLD-CCNC: 21 U/L — SIGNIFICANT CHANGE UP (ref 12–78)
ANION GAP SERPL CALC-SCNC: 6 MMOL/L — SIGNIFICANT CHANGE UP (ref 5–17)
AST SERPL-CCNC: 20 U/L — SIGNIFICANT CHANGE UP (ref 15–37)
BASOPHILS # BLD AUTO: 0.1 K/UL — SIGNIFICANT CHANGE UP (ref 0–0.2)
BASOPHILS NFR BLD AUTO: 1.4 % — SIGNIFICANT CHANGE UP (ref 0–2)
BILIRUB SERPL-MCNC: 0.4 MG/DL — SIGNIFICANT CHANGE UP (ref 0.2–1.2)
BUN SERPL-MCNC: 9 MG/DL — SIGNIFICANT CHANGE UP (ref 7–23)
CALCIUM SERPL-MCNC: 9.4 MG/DL — SIGNIFICANT CHANGE UP (ref 8.5–10.1)
CHLORIDE SERPL-SCNC: 103 MMOL/L — SIGNIFICANT CHANGE UP (ref 96–108)
CO2 SERPL-SCNC: 26 MMOL/L — SIGNIFICANT CHANGE UP (ref 22–31)
CREAT SERPL-MCNC: 0.87 MG/DL — SIGNIFICANT CHANGE UP (ref 0.5–1.3)
EGFR: 85 ML/MIN/1.73M2 — SIGNIFICANT CHANGE UP
EOSINOPHIL # BLD AUTO: 0.14 K/UL — SIGNIFICANT CHANGE UP (ref 0–0.5)
EOSINOPHIL NFR BLD AUTO: 2 % — SIGNIFICANT CHANGE UP (ref 0–6)
GLUCOSE SERPL-MCNC: 102 MG/DL — HIGH (ref 70–99)
HCG SERPL-ACNC: <1 MIU/ML — SIGNIFICANT CHANGE UP
HCT VFR BLD CALC: 40.4 % — SIGNIFICANT CHANGE UP (ref 34.5–45)
HGB BLD-MCNC: 13.9 G/DL — SIGNIFICANT CHANGE UP (ref 11.5–15.5)
IMM GRANULOCYTES NFR BLD AUTO: 0.3 % — SIGNIFICANT CHANGE UP (ref 0–0.9)
LYMPHOCYTES # BLD AUTO: 1.4 K/UL — SIGNIFICANT CHANGE UP (ref 1–3.3)
LYMPHOCYTES # BLD AUTO: 20.3 % — SIGNIFICANT CHANGE UP (ref 13–44)
MCHC RBC-ENTMCNC: 32 PG — SIGNIFICANT CHANGE UP (ref 27–34)
MCHC RBC-ENTMCNC: 34.4 GM/DL — SIGNIFICANT CHANGE UP (ref 32–36)
MCV RBC AUTO: 92.9 FL — SIGNIFICANT CHANGE UP (ref 80–100)
MONOCYTES # BLD AUTO: 0.4 K/UL — SIGNIFICANT CHANGE UP (ref 0–0.9)
MONOCYTES NFR BLD AUTO: 5.8 % — SIGNIFICANT CHANGE UP (ref 2–14)
NEUTROPHILS # BLD AUTO: 4.84 K/UL — SIGNIFICANT CHANGE UP (ref 1.8–7.4)
NEUTROPHILS NFR BLD AUTO: 70.2 % — SIGNIFICANT CHANGE UP (ref 43–77)
PLATELET # BLD AUTO: 367 K/UL — SIGNIFICANT CHANGE UP (ref 150–400)
POTASSIUM SERPL-MCNC: 3.9 MMOL/L — SIGNIFICANT CHANGE UP (ref 3.5–5.3)
POTASSIUM SERPL-SCNC: 3.9 MMOL/L — SIGNIFICANT CHANGE UP (ref 3.5–5.3)
PROT SERPL-MCNC: 8.5 GM/DL — HIGH (ref 6–8.3)
RBC # BLD: 4.35 M/UL — SIGNIFICANT CHANGE UP (ref 3.8–5.2)
RBC # FLD: 12.6 % — SIGNIFICANT CHANGE UP (ref 10.3–14.5)
SODIUM SERPL-SCNC: 135 MMOL/L — SIGNIFICANT CHANGE UP (ref 135–145)
TROPONIN I, HIGH SENSITIVITY RESULT: 3.44 NG/L — SIGNIFICANT CHANGE UP
WBC # BLD: 6.9 K/UL — SIGNIFICANT CHANGE UP (ref 3.8–10.5)
WBC # FLD AUTO: 6.9 K/UL — SIGNIFICANT CHANGE UP (ref 3.8–10.5)

## 2024-07-11 PROCEDURE — 36415 COLL VENOUS BLD VENIPUNCTURE: CPT

## 2024-07-11 PROCEDURE — 99285 EMERGENCY DEPT VISIT HI MDM: CPT

## 2024-07-11 PROCEDURE — 36000 PLACE NEEDLE IN VEIN: CPT

## 2024-07-11 PROCEDURE — 93005 ELECTROCARDIOGRAM TRACING: CPT

## 2024-07-11 PROCEDURE — 84484 ASSAY OF TROPONIN QUANT: CPT

## 2024-07-11 PROCEDURE — 99285 EMERGENCY DEPT VISIT HI MDM: CPT | Mod: 25

## 2024-07-11 PROCEDURE — 84702 CHORIONIC GONADOTROPIN TEST: CPT

## 2024-07-11 PROCEDURE — 85025 COMPLETE CBC W/AUTO DIFF WBC: CPT

## 2024-07-11 PROCEDURE — 80053 COMPREHEN METABOLIC PANEL: CPT

## 2024-07-11 PROCEDURE — 71045 X-RAY EXAM CHEST 1 VIEW: CPT | Mod: 26

## 2024-07-11 PROCEDURE — 71045 X-RAY EXAM CHEST 1 VIEW: CPT

## 2024-07-11 PROCEDURE — 93010 ELECTROCARDIOGRAM REPORT: CPT

## 2024-07-11 RX ORDER — SODIUM CHLORIDE 0.9 % (FLUSH) 0.9 %
1000 SYRINGE (ML) INJECTION ONCE
Refills: 0 | Status: COMPLETED | OUTPATIENT
Start: 2024-07-11 | End: 2024-07-11

## 2024-07-11 RX ADMIN — Medication 1000 MILLILITER(S): at 11:11

## 2024-07-12 ENCOUNTER — APPOINTMENT (OUTPATIENT)
Dept: INTERNAL MEDICINE | Facility: CLINIC | Age: 44
End: 2024-07-12
Payer: COMMERCIAL

## 2024-07-12 VITALS
HEIGHT: 61 IN | BODY MASS INDEX: 26.81 KG/M2 | WEIGHT: 142 LBS | SYSTOLIC BLOOD PRESSURE: 122 MMHG | DIASTOLIC BLOOD PRESSURE: 68 MMHG

## 2024-07-12 DIAGNOSIS — Z00.00 ENCOUNTER FOR GENERAL ADULT MEDICAL EXAMINATION W/OUT ABNORMAL FINDINGS: ICD-10-CM

## 2024-07-12 PROCEDURE — 36415 COLL VENOUS BLD VENIPUNCTURE: CPT

## 2024-07-12 PROCEDURE — 99396 PREV VISIT EST AGE 40-64: CPT

## 2024-07-13 LAB
ALBUMIN SERPL ELPH-MCNC: 4.8 G/DL
ALT SERPL-CCNC: 14 U/L
ANION GAP SERPL CALC-SCNC: 10 MMOL/L
AST SERPL-CCNC: 21 U/L
BASOPHILS # BLD AUTO: 0.09 K/UL
BASOPHILS NFR BLD AUTO: 1.6 %
BILIRUB SERPL-MCNC: 0.4 MG/DL
BUN SERPL-MCNC: 11 MG/DL
CALCIUM SERPL-MCNC: 9.7 MG/DL
CHLORIDE SERPL-SCNC: 100 MMOL/L
CHOLEST SERPL-MCNC: 217 MG/DL
CO2 SERPL-SCNC: 26 MMOL/L
CREAT SERPL-MCNC: 0.79 MG/DL
EGFR: 96 ML/MIN/1.73M2
EOSINOPHIL # BLD AUTO: 0.3 K/UL
EOSINOPHIL NFR BLD AUTO: 5.4 %
GLUCOSE SERPL-MCNC: 89 MG/DL
HCT VFR BLD CALC: 41 %
HDLC SERPL-MCNC: 77 MG/DL
HGB BLD-MCNC: 12.7 G/DL
IMM GRANULOCYTES NFR BLD AUTO: 0.2 %
LDLC SERPL CALC-MCNC: 129 MG/DL
LYMPHOCYTES # BLD AUTO: 2.14 K/UL
LYMPHOCYTES NFR BLD AUTO: 38.4 %
MAN DIFF?: NORMAL
MCHC RBC-ENTMCNC: 31 GM/DL
MCHC RBC-ENTMCNC: 31.4 PG
MCV RBC AUTO: 101.2 FL
MONOCYTES NFR BLD AUTO: 8.2 %
NEUTROPHILS # BLD AUTO: 2.58 K/UL
NEUTROPHILS NFR BLD AUTO: 46.2 %
NONHDLC SERPL-MCNC: 140 MG/DL
PLATELET # BLD AUTO: 349 K/UL
POTASSIUM SERPL-SCNC: 4.7 MMOL/L
PROT SERPL-MCNC: 7.7 G/DL
RBC # BLD: 4.05 M/UL
RBC # FLD: 13.7 %
TSH SERPL-ACNC: 3.91 UIU/ML
WBC # FLD AUTO: 5.58 K/UL